# Patient Record
Sex: FEMALE | URBAN - METROPOLITAN AREA
[De-identification: names, ages, dates, MRNs, and addresses within clinical notes are randomized per-mention and may not be internally consistent; named-entity substitution may affect disease eponyms.]

---
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
## 2024-01-29 ENCOUNTER — OFFICE VISIT (OUTPATIENT)
Dept: NEUROLOGY | Facility: CLINIC | Age: 19
End: 2024-01-29
Payer: COMMERCIAL

## 2024-01-29 VITALS
BODY MASS INDEX: 21.13 KG/M2 | DIASTOLIC BLOOD PRESSURE: 72 MMHG | HEART RATE: 88 BPM | OXYGEN SATURATION: 98 % | SYSTOLIC BLOOD PRESSURE: 122 MMHG | HEIGHT: 72 IN | WEIGHT: 156 LBS

## 2024-01-29 DIAGNOSIS — G40.909 NONINTRACTABLE EPILEPSY WITHOUT STATUS EPILEPTICUS, UNSPECIFIED EPILEPSY TYPE (CMS/HCC): Primary | ICD-10-CM

## 2024-01-29 PROCEDURE — 99214 OFFICE O/P EST MOD 30 MIN: CPT | Performed by: STUDENT IN AN ORGANIZED HEALTH CARE EDUCATION/TRAINING PROGRAM

## 2024-01-29 PROCEDURE — 3008F BODY MASS INDEX DOCD: CPT | Performed by: STUDENT IN AN ORGANIZED HEALTH CARE EDUCATION/TRAINING PROGRAM

## 2024-01-29 RX ORDER — LAMOTRIGINE 150 MG/1
150 TABLET ORAL 2 TIMES DAILY
Qty: 180 TABLET | Refills: 1 | Status: SHIPPED | OUTPATIENT
Start: 2024-01-29 | End: 2024-03-22 | Stop reason: SDUPTHER

## 2024-01-29 RX ORDER — LAMOTRIGINE 150 MG/1
150 TABLET ORAL DAILY
Qty: 90 TABLET | Refills: 3 | Status: SHIPPED | OUTPATIENT
Start: 2024-01-29 | End: 2024-01-29

## 2024-01-29 RX ORDER — FOLIC ACID 1 MG/1
1 TABLET ORAL DAILY
Qty: 90 TABLET | Refills: 1 | Status: SHIPPED | OUTPATIENT
Start: 2024-01-29 | End: 2024-03-22 | Stop reason: SDUPTHER

## 2024-01-29 RX ORDER — FOLIC ACID 1 MG/1
1 TABLET ORAL DAILY
Qty: 90 TABLET | Refills: 1 | Status: SHIPPED | OUTPATIENT
Start: 2024-01-29 | End: 2024-01-29

## 2024-01-29 NOTE — PATIENT INSTRUCTIONS
Keep seizure calendar to record the frequency and type of seizure  Obtain brain MRI (at [address]) and routine EEG  Go for bloodwork in the morning before you take your medication.  If you take your medication at 10am please get blood tests at 9-9:30am.  Continue Lamotrigine 150mg twice a day  Folic acid 1mg daily   Follow-up visit in 3 months
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
## 2024-02-09 ENCOUNTER — TELEPHONE (OUTPATIENT)
Dept: NEUROLOGY | Facility: CLINIC | Age: 19
End: 2024-02-09
Payer: COMMERCIAL

## 2024-02-09 NOTE — TELEPHONE ENCOUNTER
Called patient and left a voice-mail of approval information of MRI BRAIN WITHOUT CONTRAST. Authorization # is [id number]). I also left the phone number to Central Scheduling for patient to schedule her appointment.

## 2024-03-21 ENCOUNTER — HOSPITAL ENCOUNTER (EMERGENCY)
Facility: HOSPITAL | Age: 19
Discharge: HOME | End: 2024-03-21
Attending: EMERGENCY MEDICINE | Admitting: EMERGENCY MEDICINE
Payer: COMMERCIAL

## 2024-03-21 ENCOUNTER — APPOINTMENT (EMERGENCY)
Dept: RADIOLOGY | Facility: HOSPITAL | Age: 19
End: 2024-03-21
Payer: COMMERCIAL

## 2024-03-21 VITALS
RESPIRATION RATE: 19 BRPM | HEIGHT: 72 IN | TEMPERATURE: 96.5 F | SYSTOLIC BLOOD PRESSURE: 104 MMHG | BODY MASS INDEX: 20.99 KG/M2 | WEIGHT: 155 LBS | HEART RATE: 58 BPM | DIASTOLIC BLOOD PRESSURE: 55 MMHG | OXYGEN SATURATION: 99 %

## 2024-03-21 DIAGNOSIS — G40.919 BREAKTHROUGH SEIZURE (CMS/HCC): Primary | ICD-10-CM

## 2024-03-21 LAB
ALBUMIN SERPL-MCNC: 4.5 G/DL (ref 3.5–5.7)
ALP SERPL-CCNC: 83 IU/L (ref 34–125)
ALT SERPL-CCNC: 11 IU/L (ref 7–52)
ANION GAP SERPL CALC-SCNC: 11 MEQ/L (ref 3–15)
AST SERPL-CCNC: 16 IU/L (ref 13–39)
BASOPHILS # BLD: 0.04 K/UL (ref 0.01–0.1)
BASOPHILS NFR BLD: 0.4 %
BILIRUB SERPL-MCNC: 0.6 MG/DL (ref 0.3–1.2)
BUN SERPL-MCNC: 13 MG/DL (ref 7–25)
CALCIUM SERPL-MCNC: 10.1 MG/DL (ref 8.6–10.3)
CHLORIDE SERPL-SCNC: 105 MEQ/L (ref 98–107)
CO2 SERPL-SCNC: 23 MEQ/L (ref 21–31)
CREAT SERPL-MCNC: 0.9 MG/DL (ref 0.6–1.2)
DIFFERENTIAL METHOD BLD: NORMAL
EGFRCR SERPLBLD CKD-EPI 2021: >60 ML/MIN/1.73M*2
EOSINOPHIL # BLD: 0.12 K/UL (ref 0.04–0.36)
EOSINOPHIL NFR BLD: 1.3 %
ERYTHROCYTE [DISTWIDTH] IN BLOOD BY AUTOMATED COUNT: 12.1 % (ref 11.7–14.4)
FLUAV RNA SPEC QL NAA+PROBE: NEGATIVE
FLUBV RNA SPEC QL NAA+PROBE: NEGATIVE
GLUCOSE BLD-MCNC: 84 MG/DL (ref 70–99)
GLUCOSE SERPL-MCNC: 100 MG/DL (ref 70–99)
HCG SERPL-ACNC: <0.6 IU/L (MIU/ML)
HCT VFR BLD AUTO: 41.7 % (ref 35–45)
HGB BLD-MCNC: 14.1 G/DL (ref 11.8–15.7)
IMM GRANULOCYTES # BLD AUTO: 0.03 K/UL (ref 0–0.08)
IMM GRANULOCYTES NFR BLD AUTO: 0.3 %
LYMPHOCYTES # BLD: 2 K/UL (ref 1.2–3.5)
LYMPHOCYTES NFR BLD: 21.9 %
MCH RBC QN AUTO: 29.1 PG (ref 28–33.2)
MCHC RBC AUTO-ENTMCNC: 33.8 G/DL (ref 32.2–35.5)
MCV RBC AUTO: 86.2 FL (ref 83–98)
MONOCYTES # BLD: 0.52 K/UL (ref 0.28–0.8)
MONOCYTES NFR BLD: 5.7 %
NEUTROPHILS # BLD: 6.43 K/UL (ref 1.7–7)
NEUTS SEG NFR BLD: 70.4 %
NRBC BLD-RTO: 0 %
PDW BLD AUTO: 10.3 FL (ref 9.4–12.3)
PLATELET # BLD AUTO: 219 K/UL (ref 150–369)
POCT TEST: NORMAL
POTASSIUM SERPL-SCNC: 4.2 MEQ/L (ref 3.5–5.1)
PROT SERPL-MCNC: 7.5 G/DL (ref 6–8.2)
RBC # BLD AUTO: 4.84 M/UL (ref 3.93–5.22)
RSV RNA SPEC QL NAA+PROBE: NEGATIVE
S PYO DNA THROAT QL NAA+PROBE: NOT DETECTED
SARS-COV-2 RNA RESP QL NAA+PROBE: NEGATIVE
SODIUM SERPL-SCNC: 139 MEQ/L (ref 136–145)
WBC # BLD AUTO: 9.14 K/UL (ref 3.8–10.5)

## 2024-03-21 PROCEDURE — 87637 SARSCOV2&INF A&B&RSV AMP PRB: CPT

## 2024-03-21 PROCEDURE — 87651 STREP A DNA AMP PROBE: CPT

## 2024-03-21 PROCEDURE — 86308 HETEROPHILE ANTIBODY SCREEN: CPT

## 2024-03-21 PROCEDURE — 84702 CHORIONIC GONADOTROPIN TEST: CPT

## 2024-03-21 PROCEDURE — 70450 CT HEAD/BRAIN W/O DYE: CPT

## 2024-03-21 PROCEDURE — 63600000 HC DRUGS/DETAIL CODE: Mod: JZ | Performed by: EMERGENCY MEDICINE

## 2024-03-21 PROCEDURE — 3E033GC INTRODUCTION OF OTHER THERAPEUTIC SUBSTANCE INTO PERIPHERAL VEIN, PERCUTANEOUS APPROACH: ICD-10-PCS | Performed by: EMERGENCY MEDICINE

## 2024-03-21 PROCEDURE — 63600000 HC DRUGS/DETAIL CODE

## 2024-03-21 PROCEDURE — 86665 EPSTEIN-BARR CAPSID VCA: CPT

## 2024-03-21 PROCEDURE — 3E0234Z INTRODUCTION OF SERUM, TOXOID AND VACCINE INTO MUSCLE, PERCUTANEOUS APPROACH: ICD-10-PCS | Performed by: EMERGENCY MEDICINE

## 2024-03-21 PROCEDURE — 80175 DRUG SCREEN QUAN LAMOTRIGINE: CPT

## 2024-03-21 PROCEDURE — 96374 THER/PROPH/DIAG INJ IV PUSH: CPT

## 2024-03-21 PROCEDURE — 90715 TDAP VACCINE 7 YRS/> IM: CPT

## 2024-03-21 PROCEDURE — 80053 COMPREHEN METABOLIC PANEL: CPT

## 2024-03-21 PROCEDURE — 99284 EMERGENCY DEPT VISIT MOD MDM: CPT | Mod: 25

## 2024-03-21 PROCEDURE — 90471 IMMUNIZATION ADMIN: CPT

## 2024-03-21 PROCEDURE — 72125 CT NECK SPINE W/O DYE: CPT

## 2024-03-21 PROCEDURE — 71045 X-RAY EXAM CHEST 1 VIEW: CPT

## 2024-03-21 PROCEDURE — 36415 COLL VENOUS BLD VENIPUNCTURE: CPT | Performed by: EMERGENCY MEDICINE

## 2024-03-21 PROCEDURE — 85025 COMPLETE CBC W/AUTO DIFF WBC: CPT

## 2024-03-21 PROCEDURE — 93005 ELECTROCARDIOGRAM TRACING: CPT

## 2024-03-21 PROCEDURE — 73564 X-RAY EXAM KNEE 4 OR MORE: CPT | Mod: 50

## 2024-03-21 RX ORDER — ONDANSETRON HYDROCHLORIDE 2 MG/ML
4 INJECTION, SOLUTION INTRAVENOUS ONCE
Status: COMPLETED | OUTPATIENT
Start: 2024-03-21 | End: 2024-03-21

## 2024-03-21 RX ORDER — ACETAMINOPHEN 325 MG/1
975 TABLET ORAL ONCE
Status: DISCONTINUED | OUTPATIENT
Start: 2024-03-21 | End: 2024-03-21 | Stop reason: HOSPADM

## 2024-03-21 RX ADMIN — ONDANSETRON 4 MG: 2 INJECTION INTRAMUSCULAR; INTRAVENOUS at 13:41

## 2024-03-21 RX ADMIN — TETANUS TOXOID, REDUCED DIPHTHERIA TOXOID AND ACELLULAR PERTUSSIS VACCINE, ADSORBED 0.5 ML: 5; 2.5; 8; 8; 2.5 SUSPENSION INTRAMUSCULAR at 15:34

## 2024-03-21 NOTE — DISCHARGE INSTRUCTIONS
Call your neurologist in the morning for follow-up tomorrow.    Stay hydrated.  Get adequate sleep.  No alcohol use.  Continue with your lamotrigine as prescribed. You can alternate Tylenol and ibuprofen as needed for pain, no more than 3 g or 3,000 mg a day.  Make sure you take ibuprofen with food.    Return to the emergency department if you have any new numbness, weakness, headache, difficulty with walking, fainting, chest pain, shortness of breath, fevers.
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
## 2024-03-21 NOTE — ED PROVIDER NOTES
Emergency Medicine Note  HPI   HISTORY OF PRESENT ILLNESS     HPI   This is a 18-year-old female patient, past medical history of epilepsy on lamotrigine, presenting via EMS after being found down on the sidewalk status post suspected seizure.  Seizure was unwitnessed.  Patient denies neck or back pain.  Patient is complaining of headache and bilateral knee pain.  Denies abdominal pain, other extremity pain, trauma to the tongue.  Patient relates she has been taking her lamotrigine as prescribed.  She relates she went to bed last night late.  EtOH use approximately 6 to 7 days ago.  Patient is followed by Dr. Sandoval (neurology).  Last seizure November 2023.  Patient was diagnosed with seizures since age 2.  Patient also relates recent URI symptoms including cough, congestion for which she has been seen by urgent care yesterday.  Last tetanus is unknown.  Allergy to penicillins.      Patient History   PAST HISTORY     Reviewed from Nursing Triage:       Past Medical History:   Diagnosis Date   • Seizures (CMS/HCC)        No past surgical history on file.    No family history on file.    Social History     Tobacco Use   • Smoking status: Never   • Smokeless tobacco: Never   Substance Use Topics   • Alcohol use: Yes     Alcohol/week: 2.0 standard drinks of alcohol     Types: 2 Shots of liquor per week   • Drug use: Never         Review of Systems   REVIEW OF SYSTEMS     Review of Systems   HENT: Positive for congestion and sore throat.    Respiratory: Positive for cough. Negative for choking.    Gastrointestinal: Negative for abdominal pain.   Musculoskeletal: Negative for back pain and neck pain.        Positive for b/l knee pain.    Neurological: Positive for seizures and headaches.         VITALS     ED Vitals    Date/Time Temp Pulse Resp BP SpO2 Who   03/21/24 1530 -- 58 19 104/55 99 % EMU   03/21/24 1505 -- 64 18 106/60 99 % SDN   03/21/24 1215 -- 88 16 -- 98 % TS   03/21/24 1213 35.8 °C (96.5 °F) -- 16 114/53  -- TS        Pulse Ox %: 98 % (03/21/24 1458)  Pulse Ox Interpretation: Normal (03/21/24 1458)           Physical Exam   PHYSICAL EXAM     Physical Exam  Constitutional:       Comments: Awake, alert, answers questions appropriately.  Patient is mildly postictal and is somewhat confused about recent events.   HENT:      Head:      Comments: Superficial abrasions to right sided face.     Mouth/Throat:      Comments: No trauma to the tongue.  Eyes:      Extraocular Movements: Extraocular movements intact.      Pupils: Pupils are equal, round, and reactive to light.      Comments: No visual field deficit.   Cardiovascular:      Rate and Rhythm: Normal rate and regular rhythm.      Pulses: Normal pulses.      Heart sounds: Normal heart sounds. No murmur heard.     Comments: 2+ radial pulses.  Pulmonary:      Effort: Pulmonary effort is normal. No respiratory distress.      Breath sounds: Normal breath sounds. No wheezing, rhonchi or rales.   Abdominal:      General: Bowel sounds are normal.      Palpations: Abdomen is soft.      Tenderness: There is no abdominal tenderness. There is no guarding or rebound.   Musculoskeletal:         General: Normal range of motion.      Comments: Moving all 4 extremities with 5 out of 5 motor strength.  Extremities are atraumatic without deformity.  Patient relates mild tenderness with palpation to the bilateral knees.    No midline C-spine tenderness, crepitus, step-offs, deformity.   Skin:     Comments: Abrasions to the bilateral knees.   Neurological:      Mental Status: She is alert and oriented to person, place, and time.      Cranial Nerves: No cranial nerve deficit.      Sensory: No sensory deficit.      Motor: No weakness.      Gait: Gait normal.           PROCEDURES     Procedures     DATA     Results     Procedure Component Value Units Date/Time    Group A Strep by PCR, Throat Throat Swab [[id number]]  (Normal) Collected: 03/21/24 1535    Specimen: Throat Swab Updated: 03/21/24  1634     Strep A PCR, Throat Not Detected    Heterophile AB Reflex EBV Evaluation [[id number]] Collected: 03/21/24 1229    Specimen: Blood, Venous Updated: 03/21/24 1417    SARS-COV-2 (COVID-19)/ FLU A/B, AND RSV, PCR Nasopharynx [[id number]]  (Normal) Collected: 03/21/24 1305    Specimen: Nasopharyngeal Swab from Nasopharynx Updated: 03/21/24 1350     SARS-CoV-2 (COVID-19) Negative     Influenza A Negative     Influenza B Negative     Respiratory Syncytial Virus Negative    Narrative:      Testing performed using real-time PCR for detection of COVID-19. EUA approved validation studies performed on site.     BhCG, Serum, Quant [[id number]]  (Normal) Collected: 03/21/24 1227    Specimen: Blood, Venous Updated: 03/21/24 1310     hCG Quant <0.6 IU/L (mIU/mL)     Comprehensive metabolic panel [[id number]]  (Abnormal) Collected: 03/21/24 1227    Specimen: Blood, Venous Updated: 03/21/24 1259     Sodium 139 mEQ/L      Potassium 4.2 mEQ/L      Comment: Results obtained on plasma. Plasma Potassium values may be up to 0.4 mEQ/L less than serum values. The differences may be greater for patients with high platelet or white cell counts.        Chloride 105 mEQ/L      CO2 23 mEQ/L      BUN 13 mg/dL      Creatinine 0.9 mg/dL      Glucose 100 mg/dL      Calcium 10.1 mg/dL      AST (SGOT) 16 IU/L      ALT (SGPT) 11 IU/L      Alkaline Phosphatase 83 IU/L      Total Protein 7.5 g/dL      Comment: Test performed on plasma which typically contains approximately 0.4 g/dL more protein than serum.        Albumin 4.5 g/dL      Bilirubin, Total 0.6 mg/dL      eGFR >60.0 mL/min/1.73m*2      Comment: Calculation based on the Chronic Kidney Disease Epidemiology Collaboration (CKD-EPI) equation refit without adjustment for race.        Anion Gap 11 mEQ/L     CBC and differential [[id number]] Collected: 03/21/24 1227    Specimen: Blood, Venous Updated: 03/21/24 1236     WBC 9.14 K/uL      RBC 4.84 M/uL      Hemoglobin 14.1 g/dL      Hematocrit 41.7  %      MCV 86.2 fL      MCH 29.1 pg      MCHC 33.8 g/dL      RDW 12.1 %      Platelets 219 K/uL      MPV 10.3 fL      Differential Type Auto     nRBC 0.0 %      Immature Granulocytes 0.3 %      Neutrophils 70.4 %      Lymphocytes 21.9 %      Monocytes 5.7 %      Eosinophils 1.3 %      Basophils 0.4 %      Immature Granulocytes, Absolute 0.03 K/uL      Neutrophils, Absolute 6.43 K/uL      Lymphocytes, Absolute 2.00 K/uL      Monocytes, Absolute 0.52 K/uL      Eosinophils, Absolute 0.12 K/uL      Basophils, Absolute 0.04 K/uL     Lamotrigine [[id number]] Collected: 03/21/24 1227    Specimen: Blood, Venous Updated: 03/21/24 1234    [place] Draw Panel [[id number]] Collected: 03/21/24 1229    Specimen: Blood, Venous Updated: 03/21/24 1233    Narrative:      The following orders were created for panel order [place] Draw Panel.  Procedure                               Abnormality         Status                     ---------                               -----------         ------                     RAINBOW GOLD[[id number]]                                     In process                   Please view results for these tests on the individual orders.    RAINBOW GOLD [[id number]] Collected: 03/21/24 1229    Specimen: Blood, Venous Updated: 03/21/24 1233          Imaging Results          X-RAY CHEST 1 VIEW (Final result)  Result time 03/21/24 14:50:05    Final result                 Impression:    IMPRESSION:  No evidence of active disease in the chest.                       Narrative:    CLINICAL HISTORY:   uri    COMMENT:    PROCEDURE: Single frontal view of the chest.    COMPARISON:   November 8, 2023    Support lines, tubes, devices, and surgical hardware, material: Monitoring  leads/wires overlie the patient.    Lungs and Pleura: The lungs appear clear. No consolidation. No effusion or  pneumothorax.    Cardiovascular and Mediastinum: The cardiomediastinal silhouette is stable    Other: No acute osseous abnormality.                                CT HEAD WITHOUT IV CONTRAST (Final result)  Result time 03/21/24 14:05:33    Final result                 Impression:    IMPRESSION:  No acute intracranial abnormality. No evidence for acute fracture  or traumatic subluxation of the cervical spine.    COMMENT: A standard noncontrast head CT was performed. Noncontrast CT  examination of the cervical spine performed following the standard protocol.  Sagittal and coronal reformations rendered from axial source images. Images  reviewed in bone and soft tissue windows.      CT DOSE:  One or more dose reduction techniques (e.g. automated exposure  control, adjustment of the mA and/or kV according to patient size, use of  iterative reconstruction technique) utilized for this examination.    Comparison:  Head CT dated November 8, 2023.    Findings:  Sulci, ventricles and basal cisterns are within normal limits for  patient's age.   Attenuation in the brain parenchyma appears within normal  limits.  No acute hemorrhage, acute territorial infarct, or mass effect is seen.  There is no extra-axial fluid collection.  The visualized paranasal sinuses are  clear.   Mastoid air cells are clear. Nasopharyngeal soft tissues are prominent  likely related to adenoid hypertrophy.      Cervicothoracic alignment: Anatomic.  Prevertebral soft tissues: Normal in thickness.  Vertebral bodies: Normal in height.  Intervertebral discs: Normal in height.  Cervical and upper thoracic spinal canal: Patent.    Axial images:  Skull base: Normal.  C1-2: Normal.  C2-3: Normal.  C3-4: Normal.  C4-5: Normal.  C5-6: Normal.  C6-7: Normal.  C7-T1: Normal.               Narrative:    STUDY:  CT of the Brain and cervical spine without contrast    CLINICAL HISTORY: Seizure. Unwitnessed fall.                               CT CERVICAL SPINE WITHOUT IV CONTRAST (Final result)  Result time 03/21/24 14:05:33    Final result                 Impression:    IMPRESSION:  No acute intracranial  abnormality. No evidence for acute fracture  or traumatic subluxation of the cervical spine.    COMMENT: A standard noncontrast head CT was performed. Noncontrast CT  examination of the cervical spine performed following the standard protocol.  Sagittal and coronal reformations rendered from axial source images. Images  reviewed in bone and soft tissue windows.      CT DOSE:  One or more dose reduction techniques (e.g. automated exposure  control, adjustment of the mA and/or kV according to patient size, use of  iterative reconstruction technique) utilized for this examination.    Comparison:  Head CT dated November 8, 2023.    Findings:  Sulci, ventricles and basal cisterns are within normal limits for  patient's age.   Attenuation in the brain parenchyma appears within normal  limits.  No acute hemorrhage, acute territorial infarct, or mass effect is seen.  There is no extra-axial fluid collection.  The visualized paranasal sinuses are  clear.   Mastoid air cells are clear. Nasopharyngeal soft tissues are prominent  likely related to adenoid hypertrophy.      Cervicothoracic alignment: Anatomic.  Prevertebral soft tissues: Normal in thickness.  Vertebral bodies: Normal in height.  Intervertebral discs: Normal in height.  Cervical and upper thoracic spinal canal: Patent.    Axial images:  Skull base: Normal.  C1-2: Normal.  C2-3: Normal.  C3-4: Normal.  C4-5: Normal.  C5-6: Normal.  C6-7: Normal.  C7-T1: Normal.               Narrative:    STUDY:  CT of the Brain and cervical spine without contrast    CLINICAL HISTORY: Seizure. Unwitnessed fall.                               X-RAY KNEE BILATERAL 4+ VIEWS (Final result)  Result time 03/21/24 13:33:38    Final result                 Impression:    IMPRESSION:  No evidence of a displaced fracture or dislocation               Narrative:    CLINICAL HISTORY:   unwitnessed sz    TECHNIQUE: 4 views of the bilateral knees    COMPARISON: None    COMMENT:    Bones: No acute  fracture.  A nondisplaced fracture could be obscured. If  clinical symptoms persist, then repeat x-ray or MRI is recommended.    Joints: No dislocation. Joint spaces are preserved. Alignment appears anatomic.    Soft tissues: No joint effusion seen.                                No orders to display       Scoring tools                                  ED Course & MDM   MDM / ED COURSE / CLINICAL IMPRESSION / DISPO     MDM    ED Course as of 03/21/24 1705   Thu Mar 21, 2024   1320 hCG Quant: <0.6  Negative [KT]   1341 X-RAY KNEE BILATERAL 4+ VIEWS  IMPRESSION:  No evidence of a displaced fracture or dislocation [KT]   1351 SARS-COV-2 (COVID-19)/ FLU A/B, AND RSV, PCR Nasopharynx  Respiratory panel is negative [KT]   1415 X-RAY KNEE BILATERAL 4+ VIEWS  IMPRESSION:  No evidence of a displaced fracture or dislocation [KT]   1415 CT CERVICAL SPINE WITHOUT IV CONTRAST  IMPRESSION:  No acute intracranial abnormality. No evidence for acute fracture  or traumatic subluxation of the cervical spine. [KT]   1415 Patient relates her [facility] [place] wanted to do a blood draw yesterday to rule out mono due to patient's URI symptoms.  Patient refused at that time as she did not want a needlestick.  Requesting Monospot testing at this time since she has IV in place. [KT]   1452 X-RAY CHEST 1 VIEW  IMPRESSION:  No evidence of active disease in the chest. [KT]   1526 Patient is feeling much improved.  Ambulates at bedside without difficulty.  Last tetanus is unknown so will update.  Will call patient with results of strep test if positive.  Labs and imaging discussed with patient.  Follow-up instructions and strict return precautions given.  All questions answered.  Patient understands and agrees with the plan. [KT]      ED Course User Index  [KT] Sherlyn Song PA C     Clinical Impression      Breakthrough seizure (CMS/HCC)     _________________     ED Disposition   Discharge                   Sherlyn Song PA  C  03/22/24 0154

## 2024-03-22 LAB
ATRIAL RATE: 75
EBV NA 1 IGG SER-ACNC: 2.8
EBV VCA IGG SER IA-ACNC: 2.26
EBV VCA IGM SER IA-ACNC: 0.08
HETEROPH AB SER QL LA: NEGATIVE
INTERPRETATION: ABNORMAL
P AXIS: 58
PR INTERVAL: 140
QRS DURATION: 84
QT INTERVAL: 394
QTC CALCULATION(BAZETT): 439
R AXIS: 87
T WAVE AXIS: 38
VENTRICULAR RATE: 75

## 2024-03-22 ASSESSMENT — ENCOUNTER SYMPTOMS
HEADACHES: 1
COUGH: 1
SEIZURES: 1
CHOKING: 0
BACK PAIN: 0
SORE THROAT: 1
ABDOMINAL PAIN: 0
NECK PAIN: 0

## 2024-03-23 LAB — LAMOTRIGINE SERPL-MCNC: 3.1 MCG/ML (ref 2.5–15)

## 2024-03-25 RX ORDER — LAMOTRIGINE 100 MG/1
TABLET ORAL
Qty: 270 TABLET | OUTPATIENT
Start: 2024-03-25

## 2024-04-09 ENCOUNTER — TELEPHONE (OUTPATIENT)
Dept: NEUROLOGY | Facility: CLINIC | Age: 19
End: 2024-04-09
Payer: COMMERCIAL

## 2024-04-09 NOTE — TELEPHONE ENCOUNTER
Called patient and left a voice-mail asking if she received prescription for seizure detection watch. I will both email and mail the script to her.

## 2024-04-19 ENCOUNTER — TELEPHONE (OUTPATIENT)
Dept: NEUROLOGY | Facility: CLINIC | Age: 19
End: 2024-04-19
Payer: COMMERCIAL

## 2024-04-19 NOTE — TELEPHONE ENCOUNTER
"Regarding: Embrace2 Watch  Contact: [phone]  ----- Message from Adriane Rodriguez MA sent at 4/17/2024  9:11 AM EDT -----       ----- Message from Jackie Canales to Na Sandoval MD sent at 4/16/2024  9:14 PM -----    Attached is a website which has a template you can use if that will be easier. I tried to attach the form as a pdf but it is to big. Above the list of requirements is the link to the form.  https://support.[employer]/en-us/articles/16994862303773-Prescription-Requirements-for-EpiMonitor      ----- Message -----       From:Rina Canales       Sent:4/16/2024  9:10 PM EDT         To:Patient Medical Advice Request Message List    Subject:Embrace2 Watch    Hi Dr. Sandoval,  Unfortunately, it looks my prescription for the Embrace2 Watch was denied because it was missing some required factors. I was wondering if you could send me another one with the following requirements?     Requirements:  - National Provider Identifier Number (NPI) of the healthcare practitioner who issued the prescription.  - Full name of the healthcare practitioner.  - Address of the healthcare practitioner.  - \"EpiMonitor\" is listed as the prescribed item.  - Patient's full name.  - Patient's date of birth.  - Doctor's signature (hand-written or digital with visible date and time is necessary for digital signatures).  - Date of issue.    ThanksJackie      ----- Message -----       From:Rina Canales       Sent:4/16/2024  8:50 PM EDT         To:Patient Medical Advice Request Message List    Subject:Embrace2 Watch    [address] [place] [address]      ----- Message -----       From:Nikolai Melendez       Sent:4/15/2024  8:37 AM EDT         To:Rina Canales    Subject:Embrace2 Watch    Arnav Mejia,    What [facility] location did you go to to get your lab work completed?      ----- Message -----       From:Rina Canales       Sent:4/11/2024  1:18 PM EDT         To:Patient Medical Advice " Request Message List    Subject:Embrace2 Watch    Hi Dr. Sandoval,  I have the labs in [facility], but I am not able to download them and send them to you. Is there anyway you can request them from [facility] like last time?  Jackie Bueno      ----- Message -----       From:Na Sandoval       Sent:4/5/2024  7:39 AM EDT         To:Rina Canales    Subject:Embrace2 Watch    Hello,  I reviewed labs from January 8th and March 21st.  Can you send me the ones in February?    Dr. Sandoval      ----- Message -----       From:Rina Canales       Sent:4/4/2024  4:06 PM EDT         To:Patient Medical Advice Request Message List    Subject:Embrace2 Watch    I do think I had missed my medication on Wednesday which might have caused what happened on Thursday if that helps      ----- Message -----       From:Rina Canales       Sent:4/4/2024  4:04 PM EDT         To:Patient Medical Advice Request Message List    Subject:Embrace2 Watch    Hi Dr Sandoval,  I redid the labs in February making sure to do it before I took my medication. Are those not showing up?  Jackie Bueno      ----- Message -----       From:Na Sandoval       Sent:4/4/2024 12:13 PM EDT         To:Rina Canales    Subject:Embrace2 Watch    Hi Jackie,    I know you are hesitant with labs.  We should recheck you level because in the ER it was much longer than the level in January.  I wonder if you may have missed medication, and that caused the lower level.  Can you go in the morning before you take your dose of medication?  Where do you want me to send the labwork ([facility], [facility], [facility])?    Dr. Sandoval      ----- Message -----       From:Rina Canales       Sent:4/3/2024 10:14 PM EDT         To:Patient Medical Advice Request Message List    Subject:Embrace2 Watch    Hi Dr. Sandoval,  I am currently taking 150mg of lamotrigine twice daily, however my memory of taking my medication the day prior is a little hazy. I was still cutting  the pills, and at night I saw there wan't a half pill where it would have been if I hadn't taken it, so I assumed I had taken all my medication for the day. I did get bloodwork done in February as requested, so I'm wondering if you can take a look at those lab results to see if the low lamotrigine levels are consistent or just a lack of having taken medication?  Jackie Bueno      ----- Message -----       From:Na Sandoval       Sent:4/2/2024 10:11 AM EDT         To:Rina Canales    Subject:Embrace2 Watch    Hi,    I'm sorry to hear about the recent seizure.  Are you still taking Lamotrigine 150mg twice a day?  Have you missed any doses of the medication around the time of the seizure?    Dr. Sandoval      ----- Message -----       From:Rina Canales       Sent:4/1/2024  9:51 PM EDT         To:Patient Medical Advice Request Message List    Subject:Embrace2 Watch    Hi Dr Sandoval!!  Perfect, if you could email me a prescription that would be great! Also just to let you know I did have a seizure on Thursday March 21st, so I wanted to check up on any blood work results and see if my medication might need changing based off the results?  Jackie Bueno      ----- Message -----       From:Na Sandoval       Sent:3/29/2024  7:58 AM EDT         To:Rina Canales    Subject:Embrace2 Watch    Hi Jackie,    We can mail you a prescription.  You have to go to [email or web address] to purchase.  Hope this helps!    Best,    Dr. Sandoval      ----- Message -----       From:Rina Canales       Sent:3/26/2024  4:53 PM EDT         To:Na Sandoval    Subject:Embrace2 Watch    Hi Dr. Sandoval,  I was thinking about that seizure-detecting watch you told me about and I'm very interested. Is there any way I could get a prescription for one? How does buying one work?  Jackie Bueno

## 2024-04-22 ENCOUNTER — TELEPHONE (OUTPATIENT)
Dept: NEUROLOGY | Facility: CLINIC | Age: 19
End: 2024-04-22
Payer: COMMERCIAL

## 2024-04-22 NOTE — TELEPHONE ENCOUNTER
"Regarding: Embrace2 Watch  Contact: [phone]  ----- Message from Adriane Rodriguez MA sent at 4/17/2024  9:11 AM EDT -----       ----- Message from Jackie Canales to Na Sandoval MD sent at 4/16/2024  9:14 PM -----    Attached is a website which has a template you can use if that will be easier. I tried to attach the form as a pdf but it is to big. Above the list of requirements is the link to the form.  https://support.[employer]/en-us/articles/16994862303773-Prescription-Requirements-for-EpiMonitor      ----- Message -----       From:Rina Canales       Sent:4/16/2024  9:10 PM EDT         To:Patient Medical Advice Request Message List    Subject:Embrace2 Watch    Hi Dr. Sandoval,  Unfortunately, it looks my prescription for the Embrace2 Watch was denied because it was missing some required factors. I was wondering if you could send me another one with the following requirements?     Requirements:  - National Provider Identifier Number (NPI) of the healthcare practitioner who issued the prescription.  - Full name of the healthcare practitioner.  - Address of the healthcare practitioner.  - \"EpiMonitor\" is listed as the prescribed item.  - Patient's full name.  - Patient's date of birth.  - Doctor's signature (hand-written or digital with visible date and time is necessary for digital signatures).  - Date of issue.    ThanksJackie      ----- Message -----       From:Rina Canales       Sent:4/16/2024  8:50 PM EDT         To:Patient Medical Advice Request Message List    Subject:Embrace2 Watch    [address] [place] [address]      ----- Message -----       From:Nikolai Melendez       Sent:4/15/2024  8:37 AM EDT         To:Rina Canales    Subject:Embrace2 Watch    Arnav Mejia,    What [facility] location did you go to to get your lab work completed?      ----- Message -----       From:Rina Canales       Sent:4/11/2024  1:18 PM EDT         To:Patient Medical Advice " Request Message List    Subject:Embrace2 Watch    Hi Dr. Sandoval,  I have the labs in [facility], but I am not able to download them and send them to you. Is there anyway you can request them from [facility] like last time?  Jackie Bueno      ----- Message -----       From:Na Sandoval       Sent:4/5/2024  7:39 AM EDT         To:Rina Canales    Subject:Embrace2 Watch    Hello,  I reviewed labs from January 8th and March 21st.  Can you send me the ones in February?    Dr. Sandoval      ----- Message -----       From:Rina Canales       Sent:4/4/2024  4:06 PM EDT         To:Patient Medical Advice Request Message List    Subject:Embrace2 Watch    I do think I had missed my medication on Wednesday which might have caused what happened on Thursday if that helps      ----- Message -----       From:Rina Canales       Sent:4/4/2024  4:04 PM EDT         To:Patient Medical Advice Request Message List    Subject:Embrace2 Watch    Hi Dr Sandoval,  I redid the labs in February making sure to do it before I took my medication. Are those not showing up?  Jackie Bueno      ----- Message -----       From:Na Sandoval       Sent:4/4/2024 12:13 PM EDT         To:Rina Canales    Subject:Embrace2 Watch    Hi Jackie,    I know you are hesitant with labs.  We should recheck you level because in the ER it was much longer than the level in January.  I wonder if you may have missed medication, and that caused the lower level.  Can you go in the morning before you take your dose of medication?  Where do you want me to send the labwork ([facility], [facility])?    Dr. Sandoval      ----- Message -----       From:Rina Canales       Sent:4/3/2024 10:14 PM EDT         To:Patient Medical Advice Request Message List    Subject:Embrace2 Watch    Hi Dr. Sandoval,  I am currently taking 150mg of lamotrigine twice daily, however my memory of taking my medication the day prior is a little hazy. I was still cutting  the pills, and at night I saw there wan't a half pill where it would have been if I hadn't taken it, so I assumed I had taken all my medication for the day. I did get bloodwork done in February as requested, so I'm wondering if you can take a look at those lab results to see if the low lamotrigine levels are consistent or just a lack of having taken medication?  Jackie Bueno      ----- Message -----       From:Na Sandoval       Sent:4/2/2024 10:11 AM EDT         To:Rina Canales    Subject:Embrace2 Watch    Hi,    I'm sorry to hear about the recent seizure.  Are you still taking Lamotrigine 150mg twice a day?  Have you missed any doses of the medication around the time of the seizure?    Dr. Sandoval      ----- Message -----       From:Rina Canales       Sent:4/1/2024  9:51 PM EDT         To:Patient Medical Advice Request Message List    Subject:Embrace2 Watch    Hi Dr Sandoval!!  Perfect, if you could email me a prescription that would be great! Also just to let you know I did have a seizure on Thursday March 21st, so I wanted to check up on any blood work results and see if my medication might need changing based off the results?  Jackie Bueno      ----- Message -----       From:Na Sandoval       Sent:3/29/2024  7:58 AM EDT         To:Rina Canales    Subject:Embrace2 Watch    Hi Jackie,    We can mail you a prescription.  You have to go to [email or web address] to purchase.  Hope this helps!    Best,    Dr. Sandoval      ----- Message -----       From:Rina Canales       Sent:3/26/2024  4:53 PM EDT         To:Na Sandoval    Subject:Embrace2 Watch    Hi Dr. Sandoval,  I was thinking about that seizure-detecting watch you told me about and I'm very interested. Is there any way I could get a prescription for one? How does buying one work?  Jackie Bueno

## 2024-05-06 ENCOUNTER — OFFICE VISIT (OUTPATIENT)
Dept: NEUROLOGY | Facility: CLINIC | Age: 19
End: 2024-05-06
Payer: COMMERCIAL

## 2024-05-06 VITALS
WEIGHT: 157 LBS | HEIGHT: 72 IN | BODY MASS INDEX: 21.26 KG/M2 | DIASTOLIC BLOOD PRESSURE: 72 MMHG | SYSTOLIC BLOOD PRESSURE: 108 MMHG | HEART RATE: 76 BPM | OXYGEN SATURATION: 99 %

## 2024-05-06 DIAGNOSIS — G40.909 NONINTRACTABLE EPILEPSY WITHOUT STATUS EPILEPTICUS, UNSPECIFIED EPILEPSY TYPE (CMS/HCC): Primary | ICD-10-CM

## 2024-05-06 PROCEDURE — 3008F BODY MASS INDEX DOCD: CPT | Performed by: STUDENT IN AN ORGANIZED HEALTH CARE EDUCATION/TRAINING PROGRAM

## 2024-05-06 PROCEDURE — 99214 OFFICE O/P EST MOD 30 MIN: CPT | Performed by: STUDENT IN AN ORGANIZED HEALTH CARE EDUCATION/TRAINING PROGRAM

## 2024-05-06 RX ORDER — MAGNESIUM CHLORIDE 64 MG
TABLET, DELAYED RELEASE (ENTERIC COATED) ORAL DAILY
COMMUNITY

## 2024-05-06 RX ORDER — LAMOTRIGINE 200 MG/1
200 TABLET ORAL 2 TIMES DAILY
Qty: 180 TABLET | Refills: 3 | Status: SHIPPED | OUTPATIENT
Start: 2024-05-06 | End: 2024-10-31 | Stop reason: SDUPTHER

## 2024-05-06 RX ORDER — LORATADINE 10 MG/1
10 TABLET ORAL DAILY
COMMUNITY
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
## 2024-05-06 NOTE — PATIENT INSTRUCTIONS
Keep seizure calendar to record the frequency and type of seizure  Obtain brain MRI (at [address]) and routine EEG  Increase Lamotrigine 200mg twice a day.  In 2 weeks, go for bloodwork in the morning before you take your medication.  If you take your medication at 10am please get blood tests at 9-9:30am.   Folic acid 1mg daily   Follow-up visit in 3-4 months

## 2024-06-17 RX ORDER — FOLIC ACID 1 MG/1
1 TABLET ORAL DAILY
Qty: 90 TABLET | Refills: 1 | Status: SHIPPED | OUTPATIENT
Start: 2024-06-17 | End: 2024-10-31 | Stop reason: SDUPTHER

## 2024-06-17 NOTE — TELEPHONE ENCOUNTER
Neurology NON CONTROL Medication Refills:      Last fill date:03/22/24   # of refills provided:1   Last office visit date:05/06/24   Next office visit date:09/16/24  When instructed to follow up:   Return in about 4 months (around 9/6/2024).   Any recent cx/no show appts:

## 2024-07-16 RX ORDER — FOLIC ACID 1 MG/1
1 TABLET ORAL DAILY
Qty: 90 TABLET | Refills: 1 | OUTPATIENT
Start: 2024-07-16 | End: 2025-01-12

## 2024-07-24 RX ORDER — LORAZEPAM 0.5 MG/1
0.5 TABLET ORAL SEE ADMIN INSTRUCTIONS
Qty: 2 TABLET | Refills: 0 | Status: SHIPPED | OUTPATIENT
Start: 2024-07-24

## 2024-07-24 NOTE — TELEPHONE ENCOUNTER
Regarding: Blood Draw  Contact: [phone]  ----- Message from Mikael Burk MA sent at 7/24/2024  7:56 AM EDT -----       ----- Message from Jackie Canales to Lori, Na MALDONADO MD sent at 7/23/2024  7:05 PM -----   Thank you!      ----- Message -----       From:Rina Canales       Sent:7/23/2024  7:05 PM EDT         To:Patient Medical Advice Request Message List    Subject:Blood Draw    [address]      ----- Message -----       From:Rina Canales       Sent:7/23/2024  7:03 PM EDT         To:Patient Medical Advice Request Message List    Subject:Blood Draw    The [facility] [place] [facility] be great!      ----- Message -----       From:Na Sandoval       Sent:7/16/2024  8:43 AM EDT         To:Rina Canales    Subject:Blood Draw    If the results are normal we call or message through [employer].  If they are not normal and we need a follow-up, we can do a virtual visit typically within a few weeks of the imaging.    What pharmacy should I sent the ativan to for you?      ----- Message -----       From:Rina Canales       Sent:7/15/2024  3:57 PM EDT         To:Patient Medical Advice Request Message List    Subject:Blood Draw    Ho Monzon,  Yes Ativan would be great, thank you. In terms of scheduling an MRI, how far out after getting it done will there be a follow up appointment? Just wondering what the next steps are and how those results will be conveyed as I would prefer to do a telemedicine appointment with my parents there.   Jackie Bueno      ----- Message -----       From:Na Sandoval       Sent:7/12/2024 10:59 AM EDT         To:Rina Canales    Subject:Blood Draw    I can prescribe you ativan to take 30-60 minutes prior to blooddraw.  You can take a second dose if needed.  It can make you a little sleepy.  I know you aren't driving so we don't have to worry about that as an issue with the medication.    Let me know what you think.    Best,  Dr. Sandoval      ----- Message  -----       From:Rina Canales       Sent:7/12/2024  2:39 AM EDT         To:Na Sandoval    Subject:Blood Draw    Hi Dr. Sandoval,  I had a question surrounding my upcoming blood draw. As you're aware, I have a serious issue with blood draws. I don't think I've mentioned that I do have pretty severe PTSD from a bad experience when I was younger, and I tend to have a panic attack as well as other issues immediately preceding. In short, I was wondering if it's possible you could write me a new prescription that also allows for me to be given some sort of sedative before hand? I have had Nitrus while getting it done before and that really seemed to work...I was wondering if I could get something like that again?  Thanks,  Jackie

## 2024-09-21 ENCOUNTER — HOSPITAL ENCOUNTER (OUTPATIENT)
Dept: RADIOLOGY | Facility: CLINIC | Age: 19
Discharge: HOME | End: 2024-09-21
Attending: STUDENT IN AN ORGANIZED HEALTH CARE EDUCATION/TRAINING PROGRAM
Payer: COMMERCIAL

## 2024-09-21 DIAGNOSIS — G40.909 NONINTRACTABLE EPILEPSY WITHOUT STATUS EPILEPTICUS, UNSPECIFIED EPILEPSY TYPE (CMS/HCC): ICD-10-CM

## 2024-11-01 RX ORDER — LAMOTRIGINE 200 MG/1
200 TABLET ORAL 2 TIMES DAILY
Qty: 180 TABLET | Refills: 3 | Status: SHIPPED | OUTPATIENT
Start: 2024-11-01 | End: 2025-11-01

## 2024-11-01 RX ORDER — FOLIC ACID 1 MG/1
1 TABLET ORAL DAILY
Qty: 90 TABLET | Refills: 1 | Status: SHIPPED | OUTPATIENT
Start: 2024-11-01 | End: 2025-04-30

## 2024-11-01 NOTE — TELEPHONE ENCOUNTER
Neurology NON CONTROL Medication Refills:      Last fill date:05/06/24   # of refills provided:3   Last office visit date:05/06/24   Next office visit date:no follow up on file    When instructed to follow up: Return in about 4 months (around 9/6/2024).    Any recent cx/no show appts:

## 2024-11-07 NOTE — TELEPHONE ENCOUNTER
Regarding: Embrace2 Watch  Contact: [phone]  ----- Message from Zeynep Boyd MA sent at 3/27/2024  9:04 AM EDT -----       ----- Message from Jackie Canales to Na Sandoval MD sent at 3/26/2024  4:53 PM -----   Hi Dr. Sandoval,  I was thinking about that seizure-detecting watch you told me about and I'm very interested. Is there any way I could get a prescription for one? How does buying one work?  Thanks,  Jackie  
[facility]

## 2025-02-19 SDOH — ECONOMIC STABILITY: INCOME INSECURITY: IN THE LAST 12 MONTHS, WAS THERE A TIME WHEN YOU WERE NOT ABLE TO PAY THE MORTGAGE OR RENT ON TIME?: NO

## 2025-02-19 SDOH — ECONOMIC STABILITY: FOOD INSECURITY: WITHIN THE PAST 12 MONTHS, YOU WORRIED THAT YOUR FOOD WOULD RUN OUT BEFORE YOU GOT MONEY TO BUY MORE.: NEVER TRUE

## 2025-02-19 SDOH — ECONOMIC STABILITY: FOOD INSECURITY: WITHIN THE PAST 12 MONTHS, THE FOOD YOU BOUGHT JUST DIDN'T LAST AND YOU DIDN'T HAVE MONEY TO GET MORE.: NEVER TRUE

## 2025-02-19 SDOH — ECONOMIC STABILITY: TRANSPORTATION INSECURITY
IN THE PAST 12 MONTHS, HAS THE LACK OF TRANSPORTATION KEPT YOU FROM MEDICAL APPOINTMENTS OR FROM GETTING MEDICATIONS?: NO

## 2025-02-19 SDOH — ECONOMIC STABILITY: TRANSPORTATION INSECURITY
IN THE PAST 12 MONTHS, HAS LACK OF TRANSPORTATION KEPT YOU FROM MEETINGS, WORK, OR FROM GETTING THINGS NEEDED FOR DAILY LIVING?: NO

## 2025-02-19 ASSESSMENT — SOCIAL DETERMINANTS OF HEALTH (SDOH): IN THE PAST 12 MONTHS, HAS THE ELECTRIC, GAS, OIL, OR WATER COMPANY THREATENED TO SHUT OFF SERVICE IN YOUR HOME?: NO

## 2025-02-20 ENCOUNTER — OFFICE VISIT (OUTPATIENT)
Dept: FAMILY MEDICINE | Facility: CLINIC | Age: 20
End: 2025-02-20
Payer: COMMERCIAL

## 2025-02-20 VITALS
OXYGEN SATURATION: 98 % | SYSTOLIC BLOOD PRESSURE: 100 MMHG | DIASTOLIC BLOOD PRESSURE: 58 MMHG | HEIGHT: 72 IN | WEIGHT: 168 LBS | BODY MASS INDEX: 22.75 KG/M2 | HEART RATE: 81 BPM | RESPIRATION RATE: 18 BRPM

## 2025-02-20 DIAGNOSIS — F41.9 ANXIETY: Primary | ICD-10-CM

## 2025-02-20 PROCEDURE — 99204 OFFICE O/P NEW MOD 45 MIN: CPT | Performed by: STUDENT IN AN ORGANIZED HEALTH CARE EDUCATION/TRAINING PROGRAM

## 2025-02-20 RX ORDER — FLUOXETINE 20 MG/1
TABLET ORAL
Qty: 83 TABLET | Refills: 0 | Status: SHIPPED | OUTPATIENT
Start: 2025-02-20 | End: 2025-05-21

## 2025-02-20 RX ORDER — LEVONORGESTREL 13.5 MG/1
1 INTRAUTERINE DEVICE INTRAUTERINE ONCE
COMMUNITY

## 2025-02-20 NOTE — PROGRESS NOTES
Subjective      Patient: Rina Canales 2005     Rina Canales is a 19 y.o. female who presents for a visit with a chief complaint of [facility]          HPI  Patient is a 19-year-old female with a PMH of epilepsy (tonic-clonic, DX age 2) who presents to [facility] as a new patient. Patient reports her last seizure was in March 2024; had 2x seizures in 2024. Has been persistently on lacmictal 200 mg.     Currently in mid-terms, increased levels of stress, had increased tiredness, decreased appetite.  Patient expressed a strong interest to initiate an anxiety medication.    Patient is currently attending [facility],     Patient's Specialists:  Neurology - Dr. Sandoval, [facility]     Review of Systems  negative except as above     Patient History:     Past Medical History:  Past Medical History:   Diagnosis Date    Seizures (CMS/[facility])      Past Surgical History:  No past surgical history on file.  Past Social History:  Social History     Socioeconomic History    Marital status: Single     Spouse name: Not on file    Number of children: Not on file    Years of education: Not on file    Highest education level: Not on file   Occupational History    Not on file   Tobacco Use    Smoking status: Never    Smokeless tobacco: Never   Substance and Sexual Activity    Alcohol use: Yes     Alcohol/week: 2.0 standard drinks of alcohol     Types: 2 Shots of liquor per week    Drug use: Never    Sexual activity: Not on file   Other Topics Concern    Not on file   Social History Narrative    Not on file     Social Drivers of Health     Financial Resource Strain: Not on file   Food Insecurity: No Food Insecurity (2/19/2025)    Hunger Vital Sign     Worried About Running Out of Food in the Last Year: Never true     Ran Out of Food in the Last Year: Never true   Transportation Needs: No Transportation Needs (2/19/2025)    PRAPARE - Transportation     Lack of Transportation (Medical): No      "Lack of Transportation (Non-Medical): No   Physical Activity: Not on file   Stress: Not on file   Social Connections: Not on file   Intimate Partner Violence: Not on file   Housing Stability: Low Risk  (2/19/2025)    Housing Stability Vital Sign     Unable to Pay for Housing in the Last Year: No     Number of Times Moved in the Last Year: 1     Homeless in the Last Year: No     Past Family History:  No family history on file.     Additional Patient Information:     Allergies: Penicillins     Medications:  Current Outpatient Medications   Medication Sig Dispense Refill    FLUoxetine (PROzac) 20 mg tablet Take 0.5 tablets (10 mg total) by mouth daily for 14 days, THEN 1 tablet (20 mg total) daily. 83 tablet 0    folic acid (FOLVITE) 1 mg tablet Take 1 tablet (1 mg total) by mouth daily. 90 tablet 1    lamoTRIgine (LaMICtal) 200 mg tablet Take 1 tablet (200 mg total) by mouth 2 (two) times a day. 180 tablet 3    levonorgestreL (FAITH) 14 mcg/24 hr (3 yrs) 13.5 mg intrauterine device intrauterine device 1 each by intrauterine route once.      melatonin tablet Take 1 mg by mouth nightly.      loratadine (CLARITIN) 10 mg tablet Take 10 mg by mouth daily.      LORazepam (ATIVAN) 0.5 mg tablet Take 1 tablet (0.5 mg total) by mouth See admin instr. Take 30-60 minutes prior to MRI.  If needed, may take second dose. 2 tablet 0    magnesium chloride 64 mg tablet,delayed release (DR/EC) Take by mouth daily.      miscellaneous medical supply [facility] [employer]  Provider info: Na Sandoval [facility] [id number]   [address] [place] [address] 1 each 0     No current facility-administered medications for this visit.        Depression Screening:     Total Score:                                        Vital Signs:  Visit Vitals  BP (!) 100/58   Pulse 81   Resp 18   Ht 1.854 m (6' 1\")   Wt 76.2 kg (168 lb)   SpO2 98%   BMI 22.16 kg/m²     Body mass index is 22.16 kg/m².          Physical Exam  Vitals and nursing note reviewed. "   Constitutional:       General: She is not in acute distress.     Appearance: Normal appearance. She is not ill-appearing or toxic-appearing.   HENT:      Head: Normocephalic and atraumatic.   Cardiovascular:      Rate and Rhythm: Normal rate and regular rhythm.      Pulses: Normal pulses.      Heart sounds: Normal heart sounds. No murmur heard.     No friction rub. No gallop.   Pulmonary:      Effort: Pulmonary effort is normal. No respiratory distress.      Breath sounds: Normal breath sounds. No stridor. No wheezing, rhonchi or rales.   Chest:      Chest wall: No tenderness.   Musculoskeletal:         General: Normal range of motion.   Neurological:      General: No focal deficit present.      Mental Status: She is alert and oriented to person, place, and time.   Psychiatric:         Mood and Affect: Mood normal.         Behavior: Behavior normal.         Thought Content: Thought content normal.         Judgment: Judgment normal.           Assessment/Plan:  Jackie was seen today for establish care.    Diagnoses and all orders for this visit:    Anxiety  -     Ambulatory referral to Psychiatry; Future  -     FLUoxetine (PROzac) 20 mg tablet; Take 0.5 tablets (10 mg total) by mouth daily for 14 days, THEN 1 tablet (20 mg total) daily.  -      Discussed case with the patient's neurologist.  The neurologist very quickly answered back and stated Prozac was safe to use even though others post marketing mention of possible seizure with its use.  -      Prozac ordered, discussed side effects with the patient, patient was agreeable to initiate, follow-up in 6 weeks for a progress check  -       Patient was advised to immediately discontinue Prozac should she have another seizure  -       Follow-up as needed    General health maintenance advice  It is generally recommended the patient try to maintain a healthy weight, a healthy and wholesome diet, a regular exercise routine.     Electronically Signed By:  Jeffrey Schaeffer  DO [facility]  02/20/25 at 1:46 PM

## 2025-05-12 RX ORDER — LAMOTRIGINE 200 MG/1
200 TABLET ORAL 2 TIMES DAILY
Qty: 120 TABLET | Refills: 0 | Status: SHIPPED | OUTPATIENT
Start: 2025-05-12 | End: 2025-07-11

## 2025-05-15 DIAGNOSIS — F41.9 ANXIETY: ICD-10-CM

## 2025-05-15 RX ORDER — FLUOXETINE 20 MG/1
TABLET ORAL
Qty: 30 TABLET | Refills: 3 | Status: SHIPPED | OUTPATIENT
Start: 2025-05-15

## 2025-05-20 RX ORDER — FOLIC ACID 1 MG/1
1 TABLET ORAL DAILY
Qty: 90 TABLET | Refills: 1 | Status: SHIPPED | OUTPATIENT
Start: 2025-05-20 | End: 2025-11-16

## 2025-06-09 RX ORDER — LAMOTRIGINE 200 MG/1
200 TABLET ORAL 2 TIMES DAILY
Qty: 60 TABLET | Refills: 0 | Status: SHIPPED | OUTPATIENT
Start: 2025-06-09 | End: 2025-08-08

## 2025-06-09 NOTE — TELEPHONE ENCOUNTER
Neuro Medicine Refill Request     Last office visit date: 5/6/24   Next office visit date: no follow up scheduled    When instructed to follow up:  Return in about 4 months (around 9/6/2024).    Last fill date with # of refills provided:  5/12/25 0 refills

## 2025-07-14 ENCOUNTER — TELEPHONE (OUTPATIENT)
Dept: NEUROLOGY | Facility: CLINIC | Age: 20
End: 2025-07-14
Payer: COMMERCIAL

## 2025-07-14 RX ORDER — LAMOTRIGINE 200 MG/1
200 TABLET ORAL 2 TIMES DAILY
Qty: 180 TABLET | Refills: 0 | Status: SHIPPED | OUTPATIENT
Start: 2025-07-14

## 2025-07-14 NOTE — TELEPHONE ENCOUNTER
Reason for call: Patient stated she was told to schedule an appointment for medication renewal.  Patient says she at school she was wanting to know if she could get her medication renewed for 90 days and then she can schedule appointment when she is back from school in the fall?     Patient provider:Dr. Sandoval    Call back [phone]    Action needed to resolve: A call back, please

## 2025-08-05 DIAGNOSIS — F41.9 ANXIETY: ICD-10-CM

## 2025-08-06 RX ORDER — FLUOXETINE 20 MG/1
TABLET ORAL
Qty: 30 TABLET | Refills: 3 | Status: SHIPPED | OUTPATIENT
Start: 2025-08-06

## 2025-08-06 NOTE — TELEPHONE ENCOUNTER
Neuro Medicine Refill Request     Last office visit date: 5/6/24   Next office visit date:   When instructed to follow up: 4 months   Last fill date with # of refills provided:   5/20/25 1 refill   PDMP check, if applicable:

## 2025-08-06 NOTE — TELEPHONE ENCOUNTER
"Medicine Refill Request    Last Office Visit: 2/20/2025   Last Consult Visit: Visit date not found  Last Telemedicine Visit: Visit date not found    Next Appointment: 9/5/2025      Current Outpatient Medications:     FLUoxetine (PROzac) 20 mg tablet, Take one tab daily, Disp: 30 tablet, Rfl: 3    folic acid (FOLVITE) 1 mg tablet, Take 1 tablet (1 mg total) by mouth daily., Disp: 90 tablet, Rfl: 1    lamoTRIgine (LaMICtal) 200 mg tablet, Take 1 tablet (200 mg total) by mouth 2 (two) times a day., Disp: 180 tablet, Rfl: 0    levonorgestreL (FAITH) 14 mcg/24 hr (3 yrs) 13.5 mg intrauterine device intrauterine device, 1 each by intrauterine route once., Disp: , Rfl:     loratadine (CLARITIN) 10 mg tablet, Take 10 mg by mouth daily., Disp: , Rfl:     LORazepam (ATIVAN) 0.5 mg tablet, Take 1 tablet (0.5 mg total) by mouth See admin instr. Take 30-60 minutes prior to MRI.  If needed, may take second dose., Disp: 2 tablet, Rfl: 0    magnesium chloride 64 mg tablet,delayed release (DR/EC), Take by mouth daily., Disp: , Rfl:     melatonin tablet, Take 1 mg by mouth nightly., Disp: , Rfl:     miscellaneous medical supply misc, [employer]piMonitor Provider info: Na Sandoval JOAN [id number]  [address] [place] [address], Disp: 1 each, Rfl: 0    BP Readings from Last 3 Encounters:   02/20/25 (!) 100/58   05/06/24 108/72   03/21/24 (!) 104/55       Recent Lab results:  No results found for: \"CHOL\", No results found for: \"HDL\", No results found for: \"LDLCALC\", No results found for: \"TRIG\"     Lab Results   Component Value Date    GLUCOSE 100 (H) 03/21/2024   , No results found for: \"HGBA1C\"      Lab Results   Component Value Date    CREATININE 0.9 03/21/2024       No results found for: \"TSH\"      No results found for: \"HGBA1C\"   "

## 2025-08-07 RX ORDER — FOLIC ACID 1 MG/1
1 TABLET ORAL DAILY
Qty: 90 TABLET | Refills: 1 | Status: SHIPPED | OUTPATIENT
Start: 2025-08-07 | End: 2026-02-03

## 2025-08-18 ENCOUNTER — TELEPHONE (OUTPATIENT)
Dept: NEUROLOGY | Facility: CLINIC | Age: 20
End: 2025-08-18
Payer: COMMERCIAL

## 2025-08-18 DIAGNOSIS — G40.909 NONINTRACTABLE EPILEPSY WITHOUT STATUS EPILEPTICUS, UNSPECIFIED EPILEPSY TYPE (CMS/HCC): Primary | ICD-10-CM
